# Patient Record
Sex: MALE | Race: WHITE | NOT HISPANIC OR LATINO | URBAN - METROPOLITAN AREA
[De-identification: names, ages, dates, MRNs, and addresses within clinical notes are randomized per-mention and may not be internally consistent; named-entity substitution may affect disease eponyms.]

---

## 2017-05-17 ENCOUNTER — EMERGENCY (EMERGENCY)
Facility: HOSPITAL | Age: 67
LOS: 0 days | Discharge: HOME | End: 2017-05-17
Attending: EMERGENCY MEDICINE
Payer: COMMERCIAL

## 2017-05-17 VITALS
RESPIRATION RATE: 17 BRPM | OXYGEN SATURATION: 97 % | HEIGHT: 68 IN | TEMPERATURE: 98 F | SYSTOLIC BLOOD PRESSURE: 134 MMHG | HEART RATE: 81 BPM | DIASTOLIC BLOOD PRESSURE: 78 MMHG | WEIGHT: 195.11 LBS

## 2017-05-17 VITALS
SYSTOLIC BLOOD PRESSURE: 115 MMHG | TEMPERATURE: 98 F | DIASTOLIC BLOOD PRESSURE: 72 MMHG | RESPIRATION RATE: 16 BRPM | HEART RATE: 80 BPM | OXYGEN SATURATION: 99 %

## 2017-05-17 PROCEDURE — 74020: CPT | Mod: 26

## 2017-05-17 PROCEDURE — 99284 EMERGENCY DEPT VISIT MOD MDM: CPT

## 2017-05-17 NOTE — ED ADULT NURSE NOTE - PSH
Amputation of Toe  right foot  tips of 2nd and 3rd toes amputations 2008  Dupuytren's Contracture  left hand  2005

## 2017-05-17 NOTE — ED PROVIDER NOTE - PROGRESS NOTE DETAILS
Pt ate and tolerated a full tray of lunch without nausea vomiting and abd pain. Pt's abd is soft nontender nondistended at all quadrants. Pt is given and explained the xray of abd report and advised to see his doctor as soon as possible.

## 2017-05-17 NOTE — ED PROVIDER NOTE - OBJECTIVE STATEMENT
66 years old male walked in sts he swallowed a piece of this denture one week ago and his dentist advised him today to come to ED but pt denies nausea, vomiting, abd pain, difficulty of swallowing, sob, chest pain. Pt sts he has normal appetite and normal bowel movements.

## 2017-05-17 NOTE — ED ADULT NURSE NOTE - OBJECTIVE STATEMENT
received pt sitting on stretcher stated that last week he was eating a grilled cheese sandwich and swallowed his partial denture denies any pain , normal BM,   pt noted to have insulin pump to b/l hip

## 2017-05-17 NOTE — ED PROVIDER NOTE - CONSTITUTIONAL, MLM
normal... Well appearing, well nourished, awake, alert, oriented to person, place, time/situation and in no apparent distress. Speaking in clear full sentences smiling appear very comfortable sitting up at the edge of the stretcher

## 2017-05-17 NOTE — ED PROVIDER NOTE - ENMT, MLM
Airway patent, Nasal mucosa clear. Mouth with normal mucosa. Throat has no vesicles, no oropharyngeal exudates and uvula is midline. No drooling

## 2017-05-17 NOTE — ED ADULT TRIAGE NOTE - CHIEF COMPLAINT QUOTE
Swallow partial denture one week ago and has not pass it. He denies pains and has been moving his bowel
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
## 2017-05-18 DIAGNOSIS — Z79.4 LONG TERM (CURRENT) USE OF INSULIN: ICD-10-CM

## 2017-05-18 DIAGNOSIS — T18.4XXA FOREIGN BODY IN COLON, INITIAL ENCOUNTER: ICD-10-CM

## 2017-05-18 DIAGNOSIS — Z88.0 ALLERGY STATUS TO PENICILLIN: ICD-10-CM
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
## 2017-05-18 DIAGNOSIS — T18.198A OTHER FOREIGN OBJECT IN ESOPHAGUS CAUSING OTHER INJURY, INITIAL ENCOUNTER: ICD-10-CM

## 2017-05-18 DIAGNOSIS — E78.00 PURE HYPERCHOLESTEROLEMIA, UNSPECIFIED: ICD-10-CM

## 2017-05-18 DIAGNOSIS — E11.9 TYPE 2 DIABETES MELLITUS WITHOUT COMPLICATIONS: ICD-10-CM

## 2017-07-11 ENCOUNTER — OUTPATIENT (OUTPATIENT)
Dept: OUTPATIENT SERVICES | Facility: HOSPITAL | Age: 67
LOS: 1 days | End: 2017-07-11
Payer: COMMERCIAL

## 2017-07-11 ENCOUNTER — APPOINTMENT (OUTPATIENT)
Dept: CT IMAGING | Facility: CLINIC | Age: 67
End: 2017-07-11

## 2017-07-11 DIAGNOSIS — Z00.8 ENCOUNTER FOR OTHER GENERAL EXAMINATION: ICD-10-CM

## 2017-07-11 PROCEDURE — 74176 CT ABD & PELVIS W/O CONTRAST: CPT

## 2021-02-22 ENCOUNTER — APPOINTMENT (OUTPATIENT)
Dept: ORTHOPEDIC SURGERY | Facility: CLINIC | Age: 71
End: 2021-02-22
Payer: COMMERCIAL

## 2021-02-22 DIAGNOSIS — Z01.818 ENCOUNTER FOR OTHER PREPROCEDURAL EXAMINATION: ICD-10-CM

## 2021-02-22 DIAGNOSIS — Z87.891 PERSONAL HISTORY OF NICOTINE DEPENDENCE: ICD-10-CM

## 2021-02-22 DIAGNOSIS — Z86.39 PERSONAL HISTORY OF OTHER ENDOCRINE, NUTRITIONAL AND METABOLIC DISEASE: ICD-10-CM

## 2021-02-22 PROCEDURE — 99204 OFFICE O/P NEW MOD 45 MIN: CPT

## 2021-02-22 PROCEDURE — 99072 ADDL SUPL MATRL&STAF TM PHE: CPT

## 2021-02-22 RX ORDER — SIMVASTATIN 80 MG/1
TABLET, FILM COATED ORAL
Refills: 0 | Status: ACTIVE | COMMUNITY

## 2021-02-22 RX ORDER — METFORMIN HYDROCHLORIDE 1000 MG/1
1000 TABLET, COATED ORAL
Qty: 180 | Refills: 0 | Status: ACTIVE | COMMUNITY
Start: 2020-11-30

## 2021-02-22 RX ORDER — INSULIN ASPART 100 [IU]/ML
100 INJECTION, SOLUTION INTRAVENOUS; SUBCUTANEOUS
Qty: 60 | Refills: 0 | Status: ACTIVE | COMMUNITY
Start: 2020-06-22

## 2021-02-22 RX ORDER — LEVOTHYROXINE SODIUM 0.17 MG/1
TABLET ORAL
Refills: 0 | Status: ACTIVE | COMMUNITY

## 2021-02-22 RX ORDER — LISINOPRIL 30 MG/1
TABLET ORAL
Refills: 0 | Status: ACTIVE | COMMUNITY

## 2021-02-22 RX ORDER — BLOOD SUGAR DIAGNOSTIC
STRIP MISCELLANEOUS
Qty: 400 | Refills: 0 | Status: ACTIVE | COMMUNITY
Start: 2020-11-30

## 2021-02-22 NOTE — ADDENDUM
[FreeTextEntry1] : I, Harlan Catherine wrote this note acting as a scribe for Dr. Uzair Ascencio MD on Feb 22, 2021.\par \par

## 2021-02-22 NOTE — DISCUSSION/SUMMARY
[FreeTextEntry1] : The underlying pathophysiology was reviewed with the patient. XR films were reviewed with the patient. Discussed at length the nature of the patient’s condition. Their right and left hand symptoms appear secondary to Dupuytren contracture. \par \par \par \par   The patient wishes to proceed with correction for Dupuytren at this time. The risks and benefits were reviewed with the patient.  All of his questions were answered. He will meet with our surgical scheduler.		\par \par

## 2021-02-22 NOTE — PHYSICAL EXAM
[de-identified] : Patient is WDWN, alert, and in no acute distress. Breathing is unlabored. She is grossly oriented to person, place, and time. \par \par Left hand: no skin changes, normal appearance. well healed  palm scars.\par ROM : full\par Strength:  able to make full fist. 5/5\par Sensation: light touch intact all fingers and thumb. \par Vascular: good capillary refill < 3 seconds, all fingers and thumb.\par \par Right hand: well healed scars\par Flexion contracture of ring finger 40 degrees at MCP,15 at PIP\par Nodule along little finger proximal phalanx volarly\par Wounds: none. \par Sensation: light touch intact all fingers and thumb. \par Vascular: good capillary refill < 3 seconds, all fingers and thumb.\par Mass: none.	 \par \par \par \par

## 2021-02-22 NOTE — END OF VISIT
[FreeTextEntry3] : All medical record entries made by the Scribe were at my,  Dr. Uzair Ascencio MD., direction and personally dictated by me on 02/22/2021. I have personally reviewed the chart and agree that the record accurately reflects my personal performance of the history, physical exam, assessment and plan.

## 2021-02-22 NOTE — HISTORY OF PRESENT ILLNESS
[FreeTextEntry1] : SYDNEY BUSTILLO is a RHD 70 year male who presents for initial evaluation of a Dupuytren contracture of the bilateral hands. Dr. Orozco did the previous surgery on both of the patien'ts hands. Patient reports more pain and an increased lack of movement in the right hand versus the left hand over the last couple of months. Patient is a retired banker. 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

## 2021-03-01 ENCOUNTER — OUTPATIENT (OUTPATIENT)
Dept: OUTPATIENT SERVICES | Facility: HOSPITAL | Age: 71
LOS: 1 days | End: 2021-03-01
Payer: COMMERCIAL

## 2021-03-01 VITALS
RESPIRATION RATE: 14 BRPM | DIASTOLIC BLOOD PRESSURE: 83 MMHG | TEMPERATURE: 98 F | WEIGHT: 210.1 LBS | HEIGHT: 68 IN | SYSTOLIC BLOOD PRESSURE: 154 MMHG | HEART RATE: 85 BPM | OXYGEN SATURATION: 99 %

## 2021-03-01 DIAGNOSIS — M72.0 PALMAR FASCIAL FIBROMATOSIS [DUPUYTREN]: ICD-10-CM

## 2021-03-01 DIAGNOSIS — Z01.818 ENCOUNTER FOR OTHER PREPROCEDURAL EXAMINATION: ICD-10-CM

## 2021-03-01 LAB
ALBUMIN SERPL ELPH-MCNC: 4.5 G/DL
ALP BLD-CCNC: 95 U/L
ALT SERPL-CCNC: 22 U/L
ANION GAP SERPL CALC-SCNC: 13 MMOL/L
APTT BLD: 34.5 SEC
AST SERPL-CCNC: 18 U/L
BASOPHILS # BLD AUTO: 0.05 K/UL
BASOPHILS NFR BLD AUTO: 0.7 %
BILIRUB SERPL-MCNC: 0.7 MG/DL
BUN SERPL-MCNC: 11 MG/DL
CALCIUM SERPL-MCNC: 9.8 MG/DL
CHLORIDE SERPL-SCNC: 101 MMOL/L
CO2 SERPL-SCNC: 25 MMOL/L
CREAT SERPL-MCNC: 0.81 MG/DL
EOSINOPHIL # BLD AUTO: 0.3 K/UL
EOSINOPHIL NFR BLD AUTO: 4.2 %
GLUCOSE SERPL-MCNC: 133 MG/DL
HCT VFR BLD CALC: 47.9 %
HGB BLD-MCNC: 16.1 G/DL
IMM GRANULOCYTES NFR BLD AUTO: 0.7 %
INR PPP: 1.04 RATIO
LYMPHOCYTES # BLD AUTO: 1.52 K/UL
LYMPHOCYTES NFR BLD AUTO: 21.5 %
MAN DIFF?: NORMAL
MCHC RBC-ENTMCNC: 31.1 PG
MCHC RBC-ENTMCNC: 33.6 GM/DL
MCV RBC AUTO: 92.5 FL
MONOCYTES # BLD AUTO: 0.56 K/UL
MONOCYTES NFR BLD AUTO: 7.9 %
NEUTROPHILS # BLD AUTO: 4.59 K/UL
NEUTROPHILS NFR BLD AUTO: 65 %
PLATELET # BLD AUTO: 301 K/UL
POTASSIUM SERPL-SCNC: 4.9 MMOL/L
PROT SERPL-MCNC: 7 G/DL
PT BLD: 12.3 SEC
RBC # BLD: 5.18 M/UL
RBC # FLD: 12.1 %
SODIUM SERPL-SCNC: 139 MMOL/L
WBC # FLD AUTO: 7.07 K/UL

## 2021-03-01 NOTE — H&P PST ADULT - NSICDXPASTMEDICALHX_GEN_ALL_CORE_FT
PAST MEDICAL HISTORY:  Diabetes Mellitus Type I, Controlled pt on insulin pump    Dupuytrens contracture right middle finger    Hypercholesterolemia     Hypertension     Hypothyroid     Obesity, Class I, BMI 30-34.9

## 2021-03-01 NOTE — H&P PST ADULT - NSICDXPASTSURGICALHX_GEN_ALL_CORE_FT
PAST SURGICAL HISTORY:  Amputation of Toe right foot  tips of 2nd and 3rd toes amputations 2008    Dupuytren's Contracture left hand  small finger 2005 and ring finger 2009

## 2021-03-01 NOTE — H&P PST ADULT - NSICDXFAMILYHX_GEN_ALL_CORE_FT
FAMILY HISTORY:  Father  Still living? No  Family history of throat cancer, Age at diagnosis: Age Unknown    Mother  Still living? No  FHx: heart failure, Age at diagnosis: Age Unknown

## 2021-03-01 NOTE — H&P PST ADULT - NSWEIGHTCALCTOOLDRUG_GEN_A_CORE
Consent: Written consent was obtained and risks were reviewed including but not limited to scarring, infection, bleeding, scabbing, incomplete removal, nerve damage and allergy to anesthesia.  used

## 2021-03-01 NOTE — H&P PST ADULT - NS PRO ABUSE SCREEN SUSPICION NEGLECT YN
Contacted patient and updated.orders for testing placed,Rx sent to pharmacy as patient has increased diltiazem from office visit .   no

## 2021-03-01 NOTE — H&P PST ADULT - NSICDXPROBLEM_GEN_ALL_CORE_FT
PROBLEM DIAGNOSES  Problem: Dupuytrens contracture  Assessment and Plan: right hand dupuytrens fasciectomy. medical and endocrine clearances requested. accucheck on admit. instructed to manage insulin pump per endocrinology and to bring extra supplies as pump will need to be disconnected, hold metformin day prior to surgery and take levothyroxine AM of surgery with sips of water. surgical wash instructions reviewed and verbailzed understanding. covid PCR appt confirmed for 3/10/21 at 1000

## 2021-03-01 NOTE — H&P PST ADULT - HISTORY OF PRESENT ILLNESS
71 yo male presents with a recurrence of a right middle finger dupuytrens contracture. He had surgery on the finger 7 years ago and states that the finger is flexed at a 45 degree angle. Contracture is associated with pain when he uses the hand. Denies using any pain relief measures.

## 2021-03-04 PROCEDURE — G0463: CPT

## 2021-03-09 PROBLEM — I10 ESSENTIAL (PRIMARY) HYPERTENSION: Chronic | Status: ACTIVE | Noted: 2021-03-01

## 2021-03-09 PROBLEM — E03.9 HYPOTHYROIDISM, UNSPECIFIED: Chronic | Status: ACTIVE | Noted: 2021-03-01

## 2021-03-09 PROBLEM — E66.9 OBESITY, UNSPECIFIED: Chronic | Status: ACTIVE | Noted: 2021-03-01

## 2021-03-09 PROBLEM — M72.0 PALMAR FASCIAL FIBROMATOSIS [DUPUYTREN]: Chronic | Status: ACTIVE | Noted: 2021-03-01

## 2021-03-10 ENCOUNTER — OUTPATIENT (OUTPATIENT)
Dept: OUTPATIENT SERVICES | Facility: HOSPITAL | Age: 71
LOS: 1 days | End: 2021-03-10
Payer: COMMERCIAL

## 2021-03-10 DIAGNOSIS — Z20.828 CONTACT WITH AND (SUSPECTED) EXPOSURE TO OTHER VIRAL COMMUNICABLE DISEASES: ICD-10-CM

## 2021-03-10 LAB — SARS-COV-2 RNA SPEC QL NAA+PROBE: SIGNIFICANT CHANGE UP

## 2021-03-10 PROCEDURE — U0005: CPT

## 2021-03-10 PROCEDURE — U0003: CPT

## 2021-03-11 ENCOUNTER — TRANSCRIPTION ENCOUNTER (OUTPATIENT)
Age: 71
End: 2021-03-11

## 2021-03-11 NOTE — ASU PATIENT PROFILE, ADULT - PMH
Diabetes Mellitus Type I, Controlled  pt on insulin pump  Dupuytrens contracture  right middle finger  Hypercholesterolemia    Hypertension    Hypothyroid    Obesity, Class I, BMI 30-34.9

## 2021-03-11 NOTE — ASU PATIENT PROFILE, ADULT - PSH
Amputation of Toe  right foot  tips of 2nd and 3rd toes amputations 2008  Dupuytren's Contracture  left hand  small finger 2005 and ring finger 2009

## 2021-03-12 ENCOUNTER — RESULT REVIEW (OUTPATIENT)
Age: 71
End: 2021-03-12

## 2021-03-12 ENCOUNTER — OUTPATIENT (OUTPATIENT)
Dept: OUTPATIENT SERVICES | Facility: HOSPITAL | Age: 71
LOS: 1 days | End: 2021-03-12
Payer: COMMERCIAL

## 2021-03-12 ENCOUNTER — APPOINTMENT (OUTPATIENT)
Dept: ORTHOPEDIC SURGERY | Facility: HOSPITAL | Age: 71
End: 2021-03-12

## 2021-03-12 VITALS
HEIGHT: 68 IN | RESPIRATION RATE: 17 BRPM | OXYGEN SATURATION: 99 % | TEMPERATURE: 98 F | HEART RATE: 93 BPM | DIASTOLIC BLOOD PRESSURE: 83 MMHG | WEIGHT: 208.78 LBS | SYSTOLIC BLOOD PRESSURE: 154 MMHG

## 2021-03-12 VITALS
RESPIRATION RATE: 14 BRPM | HEART RATE: 98 BPM | SYSTOLIC BLOOD PRESSURE: 149 MMHG | OXYGEN SATURATION: 100 % | DIASTOLIC BLOOD PRESSURE: 82 MMHG

## 2021-03-12 DIAGNOSIS — M72.0 PALMAR FASCIAL FIBROMATOSIS [DUPUYTREN]: ICD-10-CM

## 2021-03-12 LAB
GLUCOSE BLDC GLUCOMTR-MCNC: 171 MG/DL — HIGH (ref 70–99)
GLUCOSE BLDC GLUCOMTR-MCNC: 210 MG/DL — HIGH (ref 70–99)

## 2021-03-12 PROCEDURE — 82962 GLUCOSE BLOOD TEST: CPT

## 2021-03-12 PROCEDURE — 88304 TISSUE EXAM BY PATHOLOGIST: CPT | Mod: 26

## 2021-03-12 PROCEDURE — 26123 RELEASE PALM CONTRACTURE: CPT | Mod: F6

## 2021-03-12 PROCEDURE — 26125 RELEASE PALM CONTRACTURE: CPT | Mod: F8

## 2021-03-12 PROCEDURE — 88304 TISSUE EXAM BY PATHOLOGIST: CPT

## 2021-03-12 RX ORDER — APREPITANT 80 MG/1
40 CAPSULE ORAL ONCE
Refills: 0 | Status: COMPLETED | OUTPATIENT
Start: 2021-03-12 | End: 2021-03-12

## 2021-03-12 RX ORDER — SIMVASTATIN 20 MG/1
1 TABLET, FILM COATED ORAL
Qty: 0 | Refills: 0 | DISCHARGE

## 2021-03-12 RX ORDER — OXYCODONE AND ACETAMINOPHEN 5; 325 MG/1; MG/1
1 TABLET ORAL
Qty: 5 | Refills: 0
Start: 2021-03-12

## 2021-03-12 RX ORDER — SODIUM CHLORIDE 9 MG/ML
1000 INJECTION, SOLUTION INTRAVENOUS
Refills: 0 | Status: DISCONTINUED | OUTPATIENT
Start: 2021-03-12 | End: 2021-03-12

## 2021-03-12 RX ORDER — ONDANSETRON 8 MG/1
4 TABLET, FILM COATED ORAL ONCE
Refills: 0 | Status: DISCONTINUED | OUTPATIENT
Start: 2021-03-12 | End: 2021-03-12

## 2021-03-12 RX ORDER — HYDROMORPHONE HYDROCHLORIDE 2 MG/ML
0.5 INJECTION INTRAMUSCULAR; INTRAVENOUS; SUBCUTANEOUS
Refills: 0 | Status: DISCONTINUED | OUTPATIENT
Start: 2021-03-12 | End: 2021-03-12

## 2021-03-12 RX ORDER — INSULIN ASPART 100 [IU]/ML
0 INJECTION, SOLUTION SUBCUTANEOUS
Qty: 0 | Refills: 0 | DISCHARGE

## 2021-03-12 RX ORDER — IBUPROFEN 200 MG
1 TABLET ORAL
Qty: 30 | Refills: 0
Start: 2021-03-12

## 2021-03-12 RX ORDER — METFORMIN HYDROCHLORIDE 850 MG/1
1 TABLET ORAL
Qty: 0 | Refills: 0 | DISCHARGE

## 2021-03-12 RX ORDER — LEVOTHYROXINE SODIUM 125 MCG
1 TABLET ORAL
Qty: 0 | Refills: 0 | DISCHARGE

## 2021-03-12 RX ORDER — LISINOPRIL 2.5 MG/1
1 TABLET ORAL
Qty: 0 | Refills: 0 | DISCHARGE

## 2021-03-12 RX ORDER — CHLORHEXIDINE GLUCONATE 213 G/1000ML
1 SOLUTION TOPICAL ONCE
Refills: 0 | Status: COMPLETED | OUTPATIENT
Start: 2021-03-12 | End: 2021-03-12

## 2021-03-12 RX ORDER — OXYCODONE HYDROCHLORIDE 5 MG/1
5 TABLET ORAL ONCE
Refills: 0 | Status: DISCONTINUED | OUTPATIENT
Start: 2021-03-12 | End: 2021-03-12

## 2021-03-12 RX ADMIN — CHLORHEXIDINE GLUCONATE 1 APPLICATION(S): 213 SOLUTION TOPICAL at 06:25

## 2021-03-12 RX ADMIN — APREPITANT 40 MILLIGRAM(S): 80 CAPSULE ORAL at 06:24

## 2021-03-12 NOTE — ASU DISCHARGE PLAN (ADULT/PEDIATRIC) - ASU DC SPECIAL INSTRUCTIONSFT
Call MD for severe pain/fever/chills    Elevate hand to decrease pain/swelling    Ice to hand 20 min on and off first 48 hours after surgery while awake to decrease pain and swelling    Pain medication as needed, take a stool softener while taking pain medicine to decrease risk of constipation    Keep dressing clean and dry until office visit

## 2021-03-12 NOTE — ASU DISCHARGE PLAN (ADULT/PEDIATRIC) - CARE PROVIDER_API CALL
Uzair Ascencio)  Orthopaedic Surgery  [address]  [place] [address]  Phone: [phone]  Fax: [phone]  Follow Up Time:

## 2021-03-12 NOTE — ASU DISCHARGE PLAN (ADULT/PEDIATRIC) - FOLLOW UP APPOINTMENTS
753
Apixaban/Eliquis increases your risk for bleeding. Notify your doctor if you experience any of the following side effects: bleeding, coughing or vomiting blood, red or black stool, unexpected pain or swelling, itching or hives, chest pain, chest tightness, trouble breathing, changes in how much or how often you urinate, red or pink urine, numbness or tingling in your feet, or unusual muscle weakness. When Apixaban/Eliquis is taken with other medicines, they can affect how it works. Taking other medications such as aspirin, blood thinners, nonsteroidal anti-inflammatories, and medications that treat depression can increase your risk of bleeding. It is very important to tell your health care provider about all of the other medicines, including over-the-counter medications, herbs, and vitamins you are taking. DO NOT start, stop, or change the dosage of any medicine, including over-the-counter medicines, vitamins, and herbal products without your doctor’s approval. Any products containing aspirin or are nonsteroidal anti-inflammatories lessen the blood’s ability to form clots and add to the effect of Apixaban/Eliquis. Never take aspirin or medicines that contain aspirin without speaking to your doctor.

## 2021-03-22 ENCOUNTER — APPOINTMENT (OUTPATIENT)
Dept: ORTHOPEDIC SURGERY | Facility: CLINIC | Age: 71
End: 2021-03-22
Payer: COMMERCIAL

## 2021-03-22 PROCEDURE — 99024 POSTOP FOLLOW-UP VISIT: CPT

## 2021-03-22 RX ORDER — OXYCODONE AND ACETAMINOPHEN 5; 325 MG/1; MG/1
5-325 TABLET ORAL
Qty: 5 | Refills: 0 | Status: ACTIVE | COMMUNITY
Start: 2021-03-12

## 2021-03-22 RX ORDER — IBUPROFEN 600 MG/1
600 TABLET, FILM COATED ORAL
Qty: 30 | Refills: 0 | Status: ACTIVE | COMMUNITY
Start: 2021-03-12

## 2021-03-22 NOTE — HISTORY OF PRESENT ILLNESS
[de-identified] : s/p Right hand partial fasciectomy, Zplasty, and release of contracture of the  index, long, and ring fingers and also in the palm.\par  [de-identified] : The patient is a 70 year male who returns for the 1st postoperative visit after undergoing Right hand partial fasciectomy, Zplasty, and release of contracture of the  index, long, and ring fingers and also in the palm at [facility]. The surgery was on 03/12/2021. The patient is recovering at home. He reports mild postoperative pain. Patient denies taking oxycodone, he did however take Motrin.  [de-identified] : Patient is WDWN, alert, and in no acute distress. Breathing is unlabored. He is grossly oriented to person, place, and time.\par \par Incision is healing well. There are no signs of infection. Sutures were removed. Normal amount of postoperative edema and tenderness present.\par \par  	  [de-identified] : no imaging done today [de-identified] : Sutures removed.\par Benzoin and steri strips were applied over the incision sites. Ace bandage applied.\par Massage the scar with vitamin E oil once the strips have fallen off. \par Gentle range of motion, stretching, and strengthening exercises were encouraged. Patient should actively work on gradually increasing use of the hand, as tolerated.\par Follow up in 4 weeks.

## 2021-04-01 ENCOUNTER — APPOINTMENT (OUTPATIENT)
Dept: ORTHOPEDIC SURGERY | Facility: CLINIC | Age: 71
End: 2021-04-01
Payer: COMMERCIAL

## 2021-04-01 PROCEDURE — 99024 POSTOP FOLLOW-UP VISIT: CPT

## 2021-04-02 NOTE — ADDENDUM
[FreeTextEntry1] : I, Evelia Hunt wrote this note acting as a scribe for Dr. Uzair Ascencio on Apr 01, 2021.\par \par

## 2021-04-02 NOTE — END OF VISIT
[FreeTextEntry3] : All medical record entries made by the Scribe were at my,  Dr. Uzair Ascencio MD., direction and personally dictated by me on 04/01/2021. I have personally reviewed the chart and agree that the record accurately reflects my personal performance of the history, physical exam, assessment and plan.\par \par

## 2021-04-02 NOTE — HISTORY OF PRESENT ILLNESS
[de-identified] : s/p Right hand partial fasciectomy, Zplasty, and release of contracture of the  index, long, and ring fingers and also in the palm.\par  [de-identified] : The patient is a 70 year male who returns for the 2nd postoperative visit after undergoing Right hand partial fasciectomy, Zplasty, and release of contracture of the  index, long, and ring fingers and also in the palm at [facility]. The surgery was on 03/12/2021. Patient reports that he noticed one suture left in the incision, he is here today to get it removed.  [de-identified] : Patient is WDWN, alert, and in no acute distress. Breathing is unlabored. He is grossly oriented to person, place, and time.\par \par Incision is healing well. There are no signs of infection. Suture removed.\par FROM\par Normal sensation [de-identified] : Sutures removed.\par Massage the scar with vitamin E oil once the strips have fallen off. \par Gentle range of motion, stretching, and strengthening exercises were encouraged. Patient should actively work on gradually increasing use of the hand, as tolerated.\par Follow up in 4 weeks.		  [de-identified] : no imaging done today

## 2021-04-22 ENCOUNTER — APPOINTMENT (OUTPATIENT)
Dept: ORTHOPEDIC SURGERY | Facility: CLINIC | Age: 71
End: 2021-04-22
Payer: COMMERCIAL

## 2021-04-22 VITALS — WEIGHT: 207 LBS | BODY MASS INDEX: 31.37 KG/M2 | HEIGHT: 68 IN

## 2021-04-22 DIAGNOSIS — M72.0 PALMAR FASCIAL FIBROMATOSIS [DUPUYTREN]: ICD-10-CM

## 2021-04-22 PROCEDURE — 99024 POSTOP FOLLOW-UP VISIT: CPT

## 2021-04-22 NOTE — END OF VISIT
[FreeTextEntry3] : All medical record entries made by the Scribe were at my,  Dr. Uzair Ascencio MD., direction and personally dictated by me on 04/22/2021. I have personally reviewed the chart and agree that the record accurately reflects my personal performance of the history, physical exam, assessment and plan.\par \par

## 2021-04-22 NOTE — ADDENDUM
[FreeTextEntry1] : I, Evelia Hunt wrote this note acting as a scribe for Dr. Uzair Ascencio on Apr 22, 2021.\par \par

## 2021-04-22 NOTE — HISTORY OF PRESENT ILLNESS
[de-identified] : The patient is a 70 year male who returns for the 3rd postoperative visit after undergoing Right hand partial fasciectomy, Zplasty, and release of contracture of the  index, long, and ring fingers and also in the palm at [facility]. The surgery was on 03/12/2021. Denies numbness and tingling. [de-identified] : s/p Right hand partial fasciectomy, Zplasty, and release of contracture of the  index, long, and ring fingers and also in the palm.\par  [de-identified] : Patient is WDWN, alert, and in no acute distress. Breathing is unlabored. He is grossly oriented to person, place, and time.\par \par Incision is healing well. There are no signs of infection.\par FROM\par Normal sensation [de-identified] : no imaging done today [de-identified] : Massage the scar with vitamin E oil.\par Gentle range of motion, stretching, and strengthening exercises were encouraged. Patient should actively work on increasing use of the hand, as tolerated.
